# Patient Record
Sex: MALE | Race: BLACK OR AFRICAN AMERICAN | Employment: PART TIME | URBAN - METROPOLITAN AREA
[De-identification: names, ages, dates, MRNs, and addresses within clinical notes are randomized per-mention and may not be internally consistent; named-entity substitution may affect disease eponyms.]

---

## 2021-04-19 ENCOUNTER — OFFICE VISIT (OUTPATIENT)
Dept: URGENT CARE | Facility: CLINIC | Age: 26
End: 2021-04-19
Payer: MEDICAID

## 2021-04-19 ENCOUNTER — APPOINTMENT (OUTPATIENT)
Dept: RADIOLOGY | Facility: IMAGING CENTER | Age: 26
End: 2021-04-19
Attending: NURSE PRACTITIONER
Payer: MEDICAID

## 2021-04-19 VITALS
HEART RATE: 77 BPM | HEIGHT: 69 IN | OXYGEN SATURATION: 97 % | RESPIRATION RATE: 16 BRPM | TEMPERATURE: 98.2 F | SYSTOLIC BLOOD PRESSURE: 134 MMHG | BODY MASS INDEX: 23.25 KG/M2 | DIASTOLIC BLOOD PRESSURE: 68 MMHG | WEIGHT: 157 LBS

## 2021-04-19 DIAGNOSIS — S69.91XA INJURY OF RIGHT WRIST, INITIAL ENCOUNTER: ICD-10-CM

## 2021-04-19 DIAGNOSIS — S66.911A STRAIN OF RIGHT WRIST, INITIAL ENCOUNTER: ICD-10-CM

## 2021-04-19 PROCEDURE — 99203 OFFICE O/P NEW LOW 30 MIN: CPT | Performed by: NURSE PRACTITIONER

## 2021-04-19 PROCEDURE — 73110 X-RAY EXAM OF WRIST: CPT | Mod: TC,RT | Performed by: NURSE PRACTITIONER

## 2021-04-19 NOTE — PROGRESS NOTES
Chief Complaint   Patient presents with   • Wrist Injury     Right wrist pain from this weekend       HISTORY OF PRESENT ILLNESS: Patient is a pleasant 26 y.o. male who presents to urgent care today with complaints of wrist pain.  He was performing pull-ups yesterday when he felt pain to his right wrist.  He has had pain to the area since.  Pain is exacerbated with hand grasping and movement of the wrist.  He denies any numbness, tingling, weakness.  Denies previous injuries to this wrist.  Denies other areas of injury or trauma.  He has not tried anything for symptom relief.    There are no problems to display for this patient.      Allergies:Patient has no known allergies.    No current Epic-ordered outpatient medications on file.     No current Epic-ordered facility-administered medications on file.       History reviewed. No pertinent past medical history.    Social History     Tobacco Use   • Smoking status: Not on file   Substance Use Topics   • Alcohol use: Not on file   • Drug use: Not on file       No family status information on file.   History reviewed. No pertinent family history.    ROS:  Review of Systems   Constitutional: Negative for fever, chills, weight loss, malaise, and fatigue.   HENT: Negative for ear pain, nosebleeds, congestion, sore throat and neck pain.    Eyes: Negative for vision changes.   Neuro: Negative for headache, sensory changes, weakness, seizure, LOC.   Cardiovascular: Negative for chest pain, palpitations, orthopnea and leg swelling.   Respiratory: Negative for cough, sputum production, shortness of breath and wheezing.   Gastrointestinal: Negative for abdominal pain, nausea, vomiting or diarrhea.   Genitourinary: Negative for dysuria, urgency and frequency.  Musculoskeletal: Positive for right wrist pain.  Negative for falls, neck pain, back pain, myalgias.   Skin: Negative for rash, diaphoresis.     Exam:  /68 (BP Location: Left arm, Patient Position: Sitting, BP Cuff  "Size: Adult)   Pulse 77   Temp 36.8 °C (98.2 °F) (Temporal)   Resp 16   Ht 1.753 m (5' 9\")   Wt 71.2 kg (157 lb)   SpO2 97%   General: well-nourished, well-developed male in NAD  Head: normocephalic, atraumatic  Eyes: PERRLA, no conjunctival injection, acuity grossly intact, lids normal.  Ears: normal shape and symmetry, no tenderness, no discharge. External canals are without any significant edema or erythema. Tympanic membranes are without any inflammation, no effusion. Gross auditory acuity is intact.  Nose: symmetrical without tenderness, no discharge.  Mouth/Throat: reasonable hygiene, no erythema, exudates or tonsillar enlargement.  Neck: no masses, range of motion within normal limits, no tracheal deviation. No obvious thyroid enlargement.   Lymph: no cervical adenopathy. No supraclavicular adenopathy.   Neuro: alert and oriented. Cranial nerves 1-12 grossly intact. No sensory deficit.   Cardiovascular: regular rate and rhythm. No edema.  Pulmonary: no distress. Chest is symmetrical with respiration, no wheezes, crackles, or rhonchi.   Musculoskeletal: no clubbing, appropriate muscle tone, gait is stable.  Right wrist: Normal in appearance, tenderness over scaphoid region, pain elicited with range of motion in all directions, distal neurovascular intact, cap refill is brisk, hand strength 5/5.  No bony tenderness to hand.  Skin: warm, dry, intact, no clubbing, no cyanosis, no rashes.   Psych: appropriate mood, affect, judgement.       DX right hand radiology reading \"Dorsal ossific density likely is an os styloideum and this can be a source of pain\"      Assessment/Plan:  1. Strain of right wrist, initial encounter  DX-WRIST-COMPLETE 3+ RIGHT    REFERRAL TO SPORTS MEDICINE   2. Injury of right wrist, initial encounter             Patient presents with right wrist injury after performing pull-ups.  X-ray notes density which is likely os styloideum, otherwise no acute fracture or findings.  Suspect soft " tissue injury from mechanism of injury, nevertheless patient does have tenderness over scaphoid region.  Therefore the patient is placed in a Velcro wrist splint and given an urgent referral to sports medicine for follow-up.  He may take OTC Motrin or Tylenol for pain relief, RICE.  Supportive care, differential diagnoses, and indications for immediate follow-up discussed with patient.   Pathogenesis of diagnosis discussed including typical length and natural progression.   Instructed to return to clinic or nearest emergency department for any change in condition, further concerns, or worsening of symptoms.  Patient states understanding of the plan of care and discharge instructions.        I spent a total of 30 minutes with record review, exam, communication with the patient, and documentation of this encounter.  Please note that this dictation was created using voice recognition software. I have made every reasonable attempt to correct obvious errors, but I expect that there are errors of grammar and possibly content that I did not discover before finalizing the note.      DONALD Emery.

## 2021-04-19 NOTE — LETTER
April 19, 2021         Patient: Jorge Salazar   YOB: 1995   Date of Visit: 4/19/2021           To Whom it May Concern:    Jorge Salazar was seen in my clinic on 4/19/2021. He may be excused from work for his next three shifts.     If you have any questions or concerns, please don't hesitate to call.        Sincerely,           DONALD Emery.  Electronically Signed